# Patient Record
Sex: FEMALE | Race: BLACK OR AFRICAN AMERICAN | NOT HISPANIC OR LATINO | Employment: UNEMPLOYED | URBAN - METROPOLITAN AREA
[De-identification: names, ages, dates, MRNs, and addresses within clinical notes are randomized per-mention and may not be internally consistent; named-entity substitution may affect disease eponyms.]

---

## 2020-06-12 ENCOUNTER — SOCIAL WORK (OUTPATIENT)
Dept: ADMINISTRATIVE | Facility: OTHER | Age: 25
End: 2020-06-12

## 2020-06-12 PROBLEM — O99.013 ANEMIA AFFECTING PREGNANCY IN THIRD TRIMESTER: Status: ACTIVE | Noted: 2020-06-12

## 2020-06-12 PROBLEM — O09.93 SUPERVISION OF HIGH RISK PREGNANCY IN THIRD TRIMESTER: Status: ACTIVE | Noted: 2020-06-12

## 2020-06-12 PROBLEM — Z28.39 RUBELLA NON-IMMUNE STATUS, ANTEPARTUM: Status: ACTIVE | Noted: 2020-06-12

## 2020-06-12 PROBLEM — O16.9 ELEVATED BLOOD PRESSURE AFFECTING PREGNANCY, ANTEPARTUM: Status: RESOLVED | Noted: 2020-06-12 | Resolved: 2020-06-12

## 2020-06-12 PROBLEM — O09.33 NO PRENATAL CARE IN CURRENT PREGNANCY IN THIRD TRIMESTER: Status: ACTIVE | Noted: 2020-06-12

## 2020-06-12 PROBLEM — Z3A.34 34 WEEKS GESTATION OF PREGNANCY: Status: ACTIVE | Noted: 2020-06-12

## 2020-06-12 PROBLEM — O16.3 ELEVATED BLOOD PRESSURE AFFECTING PREGNANCY IN THIRD TRIMESTER, ANTEPARTUM: Status: ACTIVE | Noted: 2020-06-12

## 2020-06-12 PROBLEM — O16.9 ELEVATED BLOOD PRESSURE AFFECTING PREGNANCY, ANTEPARTUM: Status: ACTIVE | Noted: 2020-06-12

## 2020-06-12 PROBLEM — O09.899 RUBELLA NON-IMMUNE STATUS, ANTEPARTUM: Status: ACTIVE | Noted: 2020-06-12

## 2020-06-12 NOTE — PROGRESS NOTES
SW met with pt regarding initial OB assessment. Pt stated this is her 1st pregnancy/0-miscarriage. Pt stated lives with her father & stepmother and able to perform ADL's independently. Pt stated support system is her father/Rodolfo. Pt stated has medicaid([facility]). Pt stated does not have WIC. SW provide pt with information on other community resources.SW faxed and scanned pt's notification of pregnancy into epic.  No other needs identified at this time.    Dianne Page,MSW  Pager#[phone]

## 2020-06-14 PROBLEM — O28.8 EQUIVOCAL NON-STRESS TEST: Status: ACTIVE | Noted: 2020-06-14

## 2020-06-14 PROBLEM — O16.9 ELEVATED BLOOD PRESSURE AFFECTING PREGNANCY, ANTEPARTUM: Status: ACTIVE | Noted: 2020-06-14

## 2020-06-15 PROBLEM — O13.3 GESTATIONAL HYPERTENSION, THIRD TRIMESTER: Status: ACTIVE | Noted: 2020-06-15

## 2020-06-16 PROBLEM — Z3A.35 35 WEEKS GESTATION OF PREGNANCY: Status: ACTIVE | Noted: 2020-06-12

## 2020-06-17 PROBLEM — O14.93 PRE-ECLAMPSIA DURING PREGNANCY IN THIRD TRIMESTER, ANTEPARTUM: Status: ACTIVE | Noted: 2020-06-17

## 2020-06-17 PROBLEM — O14.93 PRE-ECLAMPSIA DURING PREGNANCY IN THIRD TRIMESTER, ANTEPARTUM: Status: RESOLVED | Noted: 2020-06-17 | Resolved: 2020-06-17

## 2020-06-17 PROBLEM — R03.0 ELEVATED BLOOD PRESSURE READING: Status: ACTIVE | Noted: 2020-06-17

## 2020-06-17 PROBLEM — N85.8 ALTERATION IN COMFORT ASSOCIATED WITH UTERINE CONTRACTIONS: Status: ACTIVE | Noted: 2020-06-17

## 2020-06-18 PROBLEM — N85.8 ALTERATION IN COMFORT ASSOCIATED WITH UTERINE CONTRACTIONS: Status: RESOLVED | Noted: 2020-06-17 | Resolved: 2020-06-18

## 2020-06-18 PROBLEM — R03.0 ELEVATED BLOOD PRESSURE READING: Status: RESOLVED | Noted: 2020-06-17 | Resolved: 2020-06-18

## 2020-06-18 PROBLEM — Z3A.35 35 WEEKS GESTATION OF PREGNANCY: Status: RESOLVED | Noted: 2020-06-12 | Resolved: 2020-06-18

## 2020-06-20 PROBLEM — O99.119 THROMBOCYTOPENIA AFFECTING PREGNANCY: Status: ACTIVE | Noted: 2020-06-20

## 2020-06-20 PROBLEM — D69.6 THROMBOCYTOPENIA AFFECTING PREGNANCY: Status: ACTIVE | Noted: 2020-06-20

## 2020-07-29 PROBLEM — O28.8 EQUIVOCAL NON-STRESS TEST: Status: RESOLVED | Noted: 2020-06-14 | Resolved: 2020-07-29

## 2020-07-29 PROBLEM — O09.93 SUPERVISION OF HIGH RISK PREGNANCY IN THIRD TRIMESTER: Status: RESOLVED | Noted: 2020-06-12 | Resolved: 2020-07-29

## 2020-07-29 PROBLEM — O09.33 NO PRENATAL CARE IN CURRENT PREGNANCY IN THIRD TRIMESTER: Status: RESOLVED | Noted: 2020-06-12 | Resolved: 2020-07-29

## 2020-07-29 PROBLEM — O16.3 ELEVATED BLOOD PRESSURE AFFECTING PREGNANCY IN THIRD TRIMESTER, ANTEPARTUM: Status: RESOLVED | Noted: 2020-06-12 | Resolved: 2020-07-29

## 2020-07-29 PROBLEM — O13.3 GESTATIONAL HYPERTENSION, THIRD TRIMESTER: Status: RESOLVED | Noted: 2020-06-15 | Resolved: 2020-07-29

## 2020-07-29 PROBLEM — O16.9 ELEVATED BLOOD PRESSURE AFFECTING PREGNANCY, ANTEPARTUM: Status: RESOLVED | Noted: 2020-06-14 | Resolved: 2020-07-29

## 2020-09-21 PROBLEM — O14.93 PRE-ECLAMPSIA DURING PREGNANCY IN THIRD TRIMESTER, ANTEPARTUM: Status: RESOLVED | Noted: 2020-06-17 | Resolved: 2020-09-21

## 2021-08-08 PROBLEM — O99.013 ANEMIA AFFECTING PREGNANCY IN THIRD TRIMESTER: Status: RESOLVED | Noted: 2020-06-12 | Resolved: 2021-08-08

## 2021-08-08 PROBLEM — Z01.419 WELL WOMAN EXAM: Status: ACTIVE | Noted: 2021-08-08

## 2021-08-08 PROBLEM — Z98.891 H/O CESAREAN SECTION: Status: ACTIVE | Noted: 2020-06-17

## 2021-08-08 PROBLEM — O99.119 THROMBOCYTOPENIA AFFECTING PREGNANCY: Status: RESOLVED | Noted: 2020-06-20 | Resolved: 2021-08-08

## 2021-08-08 PROBLEM — D69.6 THROMBOCYTOPENIA AFFECTING PREGNANCY: Status: RESOLVED | Noted: 2020-06-20 | Resolved: 2021-08-08

## 2023-06-13 ENCOUNTER — PATIENT MESSAGE (OUTPATIENT)
Dept: RESEARCH | Facility: HOSPITAL | Age: 28
End: 2023-06-13

## 2023-07-11 ENCOUNTER — PATIENT MESSAGE (OUTPATIENT)
Dept: RESEARCH | Facility: HOSPITAL | Age: 28
End: 2023-07-11